# Patient Record
Sex: MALE | Race: ASIAN | NOT HISPANIC OR LATINO | Employment: OTHER | URBAN - METROPOLITAN AREA
[De-identification: names, ages, dates, MRNs, and addresses within clinical notes are randomized per-mention and may not be internally consistent; named-entity substitution may affect disease eponyms.]

---

## 2022-05-29 ENCOUNTER — APPOINTMENT (OUTPATIENT)
Dept: GENERAL RADIOLOGY | Facility: CLINIC | Age: 82
End: 2022-05-29
Attending: EMERGENCY MEDICINE
Payer: COMMERCIAL

## 2022-05-29 ENCOUNTER — HOSPITAL ENCOUNTER (EMERGENCY)
Facility: CLINIC | Age: 82
Discharge: HOME | End: 2022-05-30
Attending: EMERGENCY MEDICINE | Admitting: EMERGENCY MEDICINE
Payer: COMMERCIAL

## 2022-05-29 ENCOUNTER — APPOINTMENT (OUTPATIENT)
Dept: CT IMAGING | Facility: CLINIC | Age: 82
End: 2022-05-29
Attending: EMERGENCY MEDICINE
Payer: COMMERCIAL

## 2022-05-29 DIAGNOSIS — N30.00 ACUTE CYSTITIS WITHOUT HEMATURIA: ICD-10-CM

## 2022-05-29 DIAGNOSIS — Z20.822 COVID-19 RULED OUT BY LABORATORY TESTING: ICD-10-CM

## 2022-05-29 DIAGNOSIS — R41.82 ALTERED MENTAL STATUS, UNSPECIFIED ALTERED MENTAL STATUS TYPE: ICD-10-CM

## 2022-05-29 LAB
ALBUMIN SERPL-MCNC: 3.5 G/DL (ref 3.4–5)
ALBUMIN UR-MCNC: 30 MG/DL
ALP SERPL-CCNC: 67 U/L (ref 40–150)
ALT SERPL W P-5'-P-CCNC: 57 U/L (ref 0–70)
ANION GAP SERPL CALCULATED.3IONS-SCNC: 9 MMOL/L (ref 3–14)
APPEARANCE UR: ABNORMAL
AST SERPL W P-5'-P-CCNC: 38 U/L (ref 0–45)
BACTERIA #/AREA URNS HPF: ABNORMAL /HPF
BASOPHILS # BLD AUTO: 0 10E3/UL (ref 0–0.2)
BASOPHILS NFR BLD AUTO: 0 %
BILIRUB SERPL-MCNC: 1.2 MG/DL (ref 0.2–1.3)
BILIRUB UR QL STRIP: NEGATIVE
BUN SERPL-MCNC: 22 MG/DL (ref 7–30)
CALCIUM SERPL-MCNC: 8.6 MG/DL (ref 8.5–10.1)
CHLORIDE BLD-SCNC: 105 MMOL/L (ref 94–109)
CO2 SERPL-SCNC: 23 MMOL/L (ref 20–32)
COLOR UR AUTO: YELLOW
CREAT SERPL-MCNC: 1.27 MG/DL (ref 0.66–1.25)
EOSINOPHIL # BLD AUTO: 0 10E3/UL (ref 0–0.7)
EOSINOPHIL NFR BLD AUTO: 0 %
ERYTHROCYTE [DISTWIDTH] IN BLOOD BY AUTOMATED COUNT: 13.2 % (ref 10–15)
GFR SERPL CREATININE-BSD FRML MDRD: 57 ML/MIN/1.73M2
GLUCOSE BLD-MCNC: 140 MG/DL (ref 70–99)
GLUCOSE UR STRIP-MCNC: NEGATIVE MG/DL
HCO3 BLDV-SCNC: 26 MMOL/L (ref 21–28)
HCT VFR BLD AUTO: 46.7 % (ref 40–53)
HGB BLD-MCNC: 16 G/DL (ref 13.3–17.7)
HGB UR QL STRIP: ABNORMAL
HOLD SPECIMEN: NORMAL
IMM GRANULOCYTES # BLD: 0 10E3/UL
IMM GRANULOCYTES NFR BLD: 0 %
KETONES UR STRIP-MCNC: NEGATIVE MG/DL
LACTATE BLD-SCNC: 1.4 MMOL/L
LEUKOCYTE ESTERASE UR QL STRIP: ABNORMAL
LYMPHOCYTES # BLD AUTO: 1.7 10E3/UL (ref 0.8–5.3)
LYMPHOCYTES NFR BLD AUTO: 21 %
MCH RBC QN AUTO: 30.8 PG (ref 26.5–33)
MCHC RBC AUTO-ENTMCNC: 34.3 G/DL (ref 31.5–36.5)
MCV RBC AUTO: 90 FL (ref 78–100)
MONOCYTES # BLD AUTO: 0.8 10E3/UL (ref 0–1.3)
MONOCYTES NFR BLD AUTO: 10 %
MUCOUS THREADS #/AREA URNS LPF: PRESENT /LPF
NEUTROPHILS # BLD AUTO: 5.6 10E3/UL (ref 1.6–8.3)
NEUTROPHILS NFR BLD AUTO: 69 %
NITRATE UR QL: NEGATIVE
NRBC # BLD AUTO: 0 10E3/UL
NRBC BLD AUTO-RTO: 0 /100
PCO2 BLDV: 38 MM HG (ref 40–50)
PH BLDV: 7.44 [PH] (ref 7.32–7.43)
PH UR STRIP: 6 [PH] (ref 5–7)
PLATELET # BLD AUTO: 158 10E3/UL (ref 150–450)
PO2 BLDV: 40 MM HG (ref 25–47)
POTASSIUM BLD-SCNC: 3.6 MMOL/L (ref 3.4–5.3)
PROT SERPL-MCNC: 7.8 G/DL (ref 6.8–8.8)
RBC # BLD AUTO: 5.19 10E6/UL (ref 4.4–5.9)
RBC URINE: 10 /HPF
SAO2 % BLDV: 77 % (ref 94–100)
SARS-COV-2 RNA RESP QL NAA+PROBE: NEGATIVE
SODIUM SERPL-SCNC: 137 MMOL/L (ref 133–144)
SP GR UR STRIP: 1.01 (ref 1–1.03)
SQUAMOUS EPITHELIAL: 10 /HPF
TRANSITIONAL EPI: <1 /HPF
TROPONIN I SERPL HS-MCNC: 18 NG/L
UROBILINOGEN UR STRIP-MCNC: NORMAL MG/DL
WBC # BLD AUTO: 8.1 10E3/UL (ref 4–11)
WBC CLUMPS #/AREA URNS HPF: PRESENT /HPF
WBC URINE: >182 /HPF
YEAST #/AREA URNS HPF: ABNORMAL /HPF

## 2022-05-29 PROCEDURE — 87086 URINE CULTURE/COLONY COUNT: CPT | Performed by: EMERGENCY MEDICINE

## 2022-05-29 PROCEDURE — 99207 PR NO BILLABLE SERVICE THIS VISIT: CPT | Performed by: PSYCHIATRY & NEUROLOGY

## 2022-05-29 PROCEDURE — 82803 BLOOD GASES ANY COMBINATION: CPT

## 2022-05-29 PROCEDURE — 99285 EMERGENCY DEPT VISIT HI MDM: CPT | Mod: 25 | Performed by: EMERGENCY MEDICINE

## 2022-05-29 PROCEDURE — 99291 CRITICAL CARE FIRST HOUR: CPT | Mod: 25 | Performed by: EMERGENCY MEDICINE

## 2022-05-29 PROCEDURE — 81001 URINALYSIS AUTO W/SCOPE: CPT | Performed by: EMERGENCY MEDICINE

## 2022-05-29 PROCEDURE — 71045 X-RAY EXAM CHEST 1 VIEW: CPT | Mod: 26 | Performed by: RADIOLOGY

## 2022-05-29 PROCEDURE — 71045 X-RAY EXAM CHEST 1 VIEW: CPT

## 2022-05-29 PROCEDURE — 80053 COMPREHEN METABOLIC PANEL: CPT | Performed by: EMERGENCY MEDICINE

## 2022-05-29 PROCEDURE — 85025 COMPLETE CBC W/AUTO DIFF WBC: CPT | Performed by: EMERGENCY MEDICINE

## 2022-05-29 PROCEDURE — 93010 ELECTROCARDIOGRAM REPORT: CPT | Performed by: EMERGENCY MEDICINE

## 2022-05-29 PROCEDURE — 70450 CT HEAD/BRAIN W/O DYE: CPT

## 2022-05-29 PROCEDURE — 70450 CT HEAD/BRAIN W/O DYE: CPT | Mod: 26 | Performed by: STUDENT IN AN ORGANIZED HEALTH CARE EDUCATION/TRAINING PROGRAM

## 2022-05-29 PROCEDURE — 96365 THER/PROPH/DIAG IV INF INIT: CPT | Performed by: EMERGENCY MEDICINE

## 2022-05-29 PROCEDURE — U0005 INFEC AGEN DETEC AMPLI PROBE: HCPCS | Performed by: EMERGENCY MEDICINE

## 2022-05-29 PROCEDURE — 93005 ELECTROCARDIOGRAM TRACING: CPT | Performed by: EMERGENCY MEDICINE

## 2022-05-29 PROCEDURE — C9803 HOPD COVID-19 SPEC COLLECT: HCPCS | Performed by: EMERGENCY MEDICINE

## 2022-05-29 PROCEDURE — 36415 COLL VENOUS BLD VENIPUNCTURE: CPT | Performed by: EMERGENCY MEDICINE

## 2022-05-29 PROCEDURE — 84484 ASSAY OF TROPONIN QUANT: CPT | Performed by: EMERGENCY MEDICINE

## 2022-05-30 ENCOUNTER — APPOINTMENT (OUTPATIENT)
Dept: MRI IMAGING | Facility: CLINIC | Age: 82
End: 2022-05-30
Attending: EMERGENCY MEDICINE
Payer: COMMERCIAL

## 2022-05-30 VITALS
HEART RATE: 85 BPM | DIASTOLIC BLOOD PRESSURE: 75 MMHG | RESPIRATION RATE: 18 BRPM | SYSTOLIC BLOOD PRESSURE: 145 MMHG | WEIGHT: 168 LBS | OXYGEN SATURATION: 95 % | TEMPERATURE: 98.6 F

## 2022-05-30 LAB
ATRIAL RATE - MUSE: 96 BPM
DIASTOLIC BLOOD PRESSURE - MUSE: NORMAL MMHG
INTERPRETATION ECG - MUSE: NORMAL
P AXIS - MUSE: 21 DEGREES
PR INTERVAL - MUSE: 212 MS
QRS DURATION - MUSE: 96 MS
QT - MUSE: 386 MS
QTC - MUSE: 487 MS
R AXIS - MUSE: 53 DEGREES
SYSTOLIC BLOOD PRESSURE - MUSE: NORMAL MMHG
T AXIS - MUSE: 55 DEGREES
VENTRICULAR RATE- MUSE: 96 BPM

## 2022-05-30 PROCEDURE — 70553 MRI BRAIN STEM W/O & W/DYE: CPT

## 2022-05-30 PROCEDURE — 250N000009 HC RX 250: Performed by: EMERGENCY MEDICINE

## 2022-05-30 PROCEDURE — 255N000002 HC RX 255 OP 636: Performed by: EMERGENCY MEDICINE

## 2022-05-30 PROCEDURE — A9585 GADOBUTROL INJECTION: HCPCS | Performed by: EMERGENCY MEDICINE

## 2022-05-30 PROCEDURE — 250N000011 HC RX IP 250 OP 636: Performed by: EMERGENCY MEDICINE

## 2022-05-30 PROCEDURE — 70553 MRI BRAIN STEM W/O & W/DYE: CPT | Mod: 26 | Performed by: RADIOLOGY

## 2022-05-30 RX ORDER — CEFTRIAXONE 1 G/1
1 INJECTION, POWDER, FOR SOLUTION INTRAMUSCULAR; INTRAVENOUS ONCE
Status: COMPLETED | OUTPATIENT
Start: 2022-05-30 | End: 2022-05-30

## 2022-05-30 RX ORDER — LIDOCAINE HYDROCHLORIDE 20 MG/ML
10 JELLY TOPICAL ONCE
Status: COMPLETED | OUTPATIENT
Start: 2022-05-30 | End: 2022-05-30

## 2022-05-30 RX ORDER — CEFDINIR 300 MG/1
300 CAPSULE ORAL 2 TIMES DAILY
Qty: 20 CAPSULE | Refills: 0 | Status: SHIPPED | OUTPATIENT
Start: 2022-05-30 | End: 2022-06-09

## 2022-05-30 RX ORDER — GADOBUTROL 604.72 MG/ML
7.5 INJECTION INTRAVENOUS ONCE
Status: COMPLETED | OUTPATIENT
Start: 2022-05-30 | End: 2022-05-30

## 2022-05-30 RX ADMIN — CEFTRIAXONE SODIUM 1 G: 1 INJECTION, POWDER, FOR SOLUTION INTRAMUSCULAR; INTRAVENOUS at 02:06

## 2022-05-30 RX ADMIN — LIDOCAINE HYDROCHLORIDE 10 ML: 20 JELLY TOPICAL at 02:26

## 2022-05-30 RX ADMIN — GADOBUTROL 7.5 ML: 604.72 INJECTION INTRAVENOUS at 01:21

## 2022-05-30 NOTE — ED NOTES
Patient arrived via ems. 500ml of NS given and 1.5mg of Narcan given by EMS  Patient presenting lethargic but gradually responding more. There is evidence of emesis on patient shirt and mouth. Patient also presents with significant loss of bowel and bladder from preceding episode of being unresponsive at home. Will continue to monitor and implement orders as needed per patient condition.

## 2022-05-30 NOTE — CONSULTS
"[facility]  Neurology Consultation    Patient Name:  Jadiel Hanson  MRN:  [id number]    :  1940  Date of Service:  May 29, 2022  Primary care provider:  No primary care provider on file.      Neurology consultation service was asked to see Jadiel Hanson by Dr. Mcintosh to evaluate LOC event.    Chief Complaint:  episode    History of Present Illness:   Jadiel Hanson is a 81 year old male with history of CAD with prior MI, HFrEF, CKD3, T2DM, ischemic stroke in 2019 with residual word finding difficulties, PAD who presents after episode at home.     History is obtain from patient, wife and niece. Wife notes that patient was in normal state of health yesterday. This morning patient reports that he was feeling generalized weakness that was making it difficult to ambulate. Due to this, wife was helping him move and during a transfer he was noted to have an episode of \"vomiting and bubbling at his mouth.\" Initially it was noted that patient may have had LOC during episode, however upon further discussion with wife she said he was indeed responsive and she asked him to track her finger moving side to side and he did so. Patient was also able to detail the event to me, and denied LOC. Aside from the secretions, patient was also noted to have urinary incontinence which per niece is not uncommon for him. Denies upper or lower extremity shaking, tongue biting, fall, hitting head. There was some confusion after event, but family notes he is currently at his baseline.     Per EMS report, patient appeared to be unresponsive when they arrived on scene therefore administered 1.2 mg of Narcan without effect. His blood glucose was in the 100's, vitally stable. Noted to have urine and vomiting on him.    No personal history of seizures. No history of head injuries, but does have history of stroke in 2019. Denies recent illnesses. No new medications.    Patient is " currently only in the [place] for ~3 months visiting family, and receives all of his medical care back home in the [facility].     ROS  A comprehensive ROS was performed and pertinent findings were included in HPI.     PMH  CKD, HTN, CAD, ischemic stroke in 2019    PSH: none    Medications   I have personally reviewed the patient's medication list.     Allergies  I have personally reviewed the patient's allergy list.     Social History  Denies tobacco, alcohol, and recreational drug use     Family History    Reviewed, and notably negative for seizures      Physical Examination   Vitals: BP (!) 147/96   Pulse 96   Temp 98.8  F (37.1  C) (Oral)   Resp 14   Wt 76.2 kg (168 lb)   SpO2 97%   General: Lying in bed, NAD  Head: normocephalic, no tongue lacerations or bruises  Eyes: no icterus, op pink and moist  Cardiac: regular rate  Respiratory: non-labored on 4L NC  GI: soft  Skin: No rash or lesion on exposed skin  Psych: Mood pleasant, affect congruent    Neuro:  Mental status: Awake, alert, attentive, oriented to self and circumstance. Tells me his birth year when asks date, and then later 20[birth year] (question language barrier). Language is fragmented with intermittent word finding difficulties.   Cranial nerves: Visual fields appear full however difficult for patient to follow commands, PERRL, conjugate gaze, EOMI, facial sensation intact, face symmetric, shoulder shrug strong, tongue/uvula midline, no dysarthria.   Motor: Normal bulk and tone. No abnormal movements. 5/5 strength bilaterally in deltoids, biceps, triceps, hand , hip flexors, hip extensors, knee flexion, knee extension, plantarflexion, dorsiflexion.   Reflexes: Normo-reflexic and symmetric biceps, brachioradialis, triceps, patellae, and achilles.  Sensory: Intact to light touch in proximal and distal aspects of all 4 extremities   Coordination: FNF without ataxia or dysmetria.   Gait: deferred.    Investigations   I have personally reviewed pertinent  labs, tests, and radiological imaging. Discussion of notable findings is included under Impression.     CT Head without contrast  Impression:  1. No definite acute intracranial pathology.   2. Small left frontal hyperdensity favored to represent artifact over  acute hemorrhage.    3. Atherosclerosis in the carotid siphons and vertebral arteries.    Was patient transferred from outside hospital?   No       Impression  Jadiel Hanson is a 81 year old male with PMH significant for CAD with history of MI, HFrEF, CKD3, T2DM, ischemic stroke in 2019 with residual word finding difficulties, PAD who presented after episode at home. Neurology was consulted to rule out seizure vs TIA as etiology of episode. Neurologic exam this evening with disorientation (?baseline) and intermittent word finding difficulties. Work up thus far with elevated Cr in the setting of known CKD. CT head without acute pathology.     I have a lower suspicion for episode being seizure based off the history obtained. He was able to follow commands though somnolent through episode, he was able to detail to me events, no shaking or abnormal movements, no tongue biting, loss of bladder present but is noted to be baseline, no seizure history. Also less likely TIA as there were no new focal deficits during episode or my exam. With that being said, it would be reasonable to obtain an outpatient seizure work up with uncharacterized event. Patient has returned to baseline therefore from a neurologic stand point patient can discharge home. Final disposition pending remainder of ED work up.       Recommendations  - Agree with infectious work up given generalized weakness, including UA  - Would be reasonable to obtain outpatient seizure work up     - MRI brain   - Routine EEG  - No indication for initiation for AED given low suspicion for seizure at this time    Thank you for involving Neurology in the care of Jadiel Hanson.  Please do not  hesitate to call with questions/concerns (consult pager [phone]).      Patient was discussed with Dr. Wen.    Ramila Marinelli MD  Neurology Resident    Attending physician: I discussed the care of this patient with the evaluating resident by telephone and I agree with the initial plan of care as documented above. Patient not seen or examined by me today.    Reinier Albert MD

## 2022-05-30 NOTE — ED TRIAGE NOTES
Found by family on floor of home unresponsive. Pt's LKW was 2pm per family. 1.2 mg narcan given by EMS. Pinpoint pupils per EMS.  . VSS en route.      Triage Assessment     Row Name 05/29/22 2006       Triage Assessment (Adult)    Airway WDL WDL       Respiratory WDL    Respiratory WDL WDL       Skin Circulation/Temperature WDL    Skin Circulation/Temperature WDL WDL       Cardiac WDL    Cardiac WDL WDL       Peripheral/Neurovascular WDL    Peripheral Neurovascular WDL WDL       Cognitive/Neuro/Behavioral WDL    Cognitive/Neuro/Behavioral WDL X;all    Level of Consciousness lethargic

## 2022-05-30 NOTE — ED PROVIDER NOTES
ED Provider Note  [facility]      History     Chief Complaint   Patient presents with     Loss of Consciousness     HPI  Jadiel Hanson is a 81 year old male who brought in by ambulance for loss of consciousness, period of unresponsiveness and subsequent confusion.  Paramedics provide the history as the patient is only able to give 1-2 word answers slurring his speech.  Per EMS report, his last known well was approximately 2 PM, had a long nap, then was found by family and was unresponsive.  They arrived on scene and administered 1.2 mg of Narcan without effect.  They found his glucose to be greater than 160 with normal vital signs.  He appeared to be protecting his airway but did have some evidence of incontinence as well as vomiting.    Prior MI, CKD, prior CVA      Review of Systems   Unable to perform ROS: Mental status change     A complete review of systems was attempted but limited due to altered mental status.    Physical Exam   BP: (!) 147/96  Pulse: 96  Temp: 98.8  F (37.1  C)  Resp: 14  Weight: 76.2 kg (168 lb)  SpO2: 97 %  Physical Exam  Constitutional:       General: He is awake. He is not in acute distress.     Appearance: Normal appearance. He is well-developed. He is not ill-appearing or toxic-appearing.   HENT:      Head: Atraumatic.   Eyes:      General: No scleral icterus.     Extraocular Movements: Extraocular movements intact.      Pupils: Pupils are equal, round, and reactive to light.   Cardiovascular:      Rate and Rhythm: Normal rate and regular rhythm.      Heart sounds: Normal heart sounds, S1 normal and S2 normal.   Pulmonary:      Effort: No respiratory distress.      Breath sounds: Normal breath sounds.   Chest:       Abdominal:      General: Bowel sounds are normal.      Palpations: Abdomen is soft.      Tenderness: There is no abdominal tenderness.   Musculoskeletal:         General: No tenderness.   Skin:     General: Skin is warm.      Findings:  "No rash.   Neurological:      Mental Status: He is oriented to person, place, and time. He is confused.      Cranial Nerves: Dysarthria present.      Comments: Mild right-sided facial droop, mild right-sided weakness in the upper and lower extremities.  When asked what year it is, he replies \"1940.\"  When asked what city we are in he replies \"[place].\"  He has lived his entire life until recently in the [facility], when I ask him who the president of the [facility] is, he does not know.    Neuro exam is slightly limited as patient does not seem to be able to follow every single command.   Psychiatric:         Mood and Affect: Affect is blunt.         Speech: Speech is slurred.         Behavior: Behavior is cooperative.         ED Course      Procedures            EKG Interpretation:      Interpreted by ELANA GARCIA MD  Time reviewed: 2015  Symptoms at time of EKG: Confusion, altered mental status  Rhythm: normal sinus sinus rhythm with first-degree AV block  Rate: normal  Axis: normal  Ectopy: none  Conduction: normal  ST Segments/ T Waves: No ST-T wave changes, poor R wave progression  Q Waves: none  Comparison to prior: No old EKG available    Clinical Impression: no acute ischemic changes        Critical Care Addendum    My initial assessment, based on my review of prehospital provider report, review of nursing observations, focused history, physical exam, review of cardiac rhythm monitor, 12 lead ECG analysis and discussion with neurology, established that Jadiel Hanson has altered mental status, which requires immediate intervention, and therefore he is critically ill.     After the initial assessment, the care team initiated multiple lab tests, initiated medication therapy with ceftriaxone and consulted with Neurology to provide stabilization care. Due to the critical nature of this patient, I reassessed nursing observations, physical exam, review of cardiac rhythm monitor, mental status and " neurologic status multiple times prior to his disposition.     Time also spent performing documentation, discussion with family to obtain medical information for decision making, reviewing test results and discussion with consultants.     Critical care time (excluding teaching time and procedures): 53 minutes.               Results for orders placed or performed during the hospital encounter of 05/29/22   CT Head w/o Contrast     Status: None    Narrative    CT HEAD W/O CONTRAST 5/29/2022 8:32 PM    History: Mental status change, unknown cause   ICD-10:    Comparison: None    Technique: Using multidetector thin collimation helical acquisition  technique, axial, coronal and sagittal CT images from the skull base  to the vertex were obtained without intravenous contrast.   (topogram) image(s) also obtained and reviewed.    Findings: There is no definite evidence of intracranial hemorrhage,  mass effect, or midline shift. Question small hyperdensity in the  anterior left frontal lobe asymmetric to the right on series 3 image  18, not well identified on sagittal or coronal imaging and favored to  represent artifact. Gray/white matter differentiation in both cerebral  hemispheres is preserved. Ventricles are proportionate to the cerebral  sulci. The basal cisterns are clear. Extensive atherosclerosis in the  carotid siphons and left vertebral artery.    The bony calvaria and the bones of the skull base are normal. The  visualized portions of the paranasal sinuses and mastoid air cells are  clear.      Impression    Impression:  1. No definite acute intracranial pathology.   2. Small left frontal hyperdensity favored to represent artifact over  acute hemorrhage.    3. Atherosclerosis in the carotid siphons and vertebral arteries.         I have personally reviewed the examination and initial interpretation  and I agree with the findings.    RUBEN KHAN MD         SYSTEM ID:  [id number]   XR Chest Port 1 View      Status: None    Narrative    Exam: XR CHEST PORT 1 VIEW, 5/29/2022 8:18 PM    Indication: altered mental status, unresponsive    Comparison: None    Findings:   Cardiac silhouette appears within normal limits. No pneumothorax. No  definite effusions. Left greater than right bibasilar opacities.      Impression    Impression: Bibasilar atelectasis/edema. Aspiration could be  considered in the setting of altered mental status.    I have personally reviewed the examination and initial interpretation  and I agree with the findings.    MARKOS ROBERTS MD         SYSTEM ID:  [id number]   MR Brain w/o & w Contrast     Status: None    Narrative    EXAM: MR BRAIN W/O and W CONTRAST  LOCATION: [facility]  DATE/TIME: 5/30/2022 12:48 AM    INDICATION: Mental status change, unknown cause  COMPARISON: 05/29/2022  CONTRAST: 7.5mL Gadavist  TECHNIQUE: MRI brain without and with contrast.    FINDINGS: On the diffusion-weighted images there is no evidence of acute ischemia. There is no discrete mass lesion or midline shift. There is no acute extra-axial fluid collection or acute intraparenchymal hemorrhage. There are appropriate flow voids   within the cavernous portions of the internal carotid arteries and the basilar artery. On the FLAIR and T2-weighted images there are a few small foci of high signal within the periventricular and subcortical white matter with mild small vessel ischemic   disease. The ventricular system, basal cisterns and the cortical sulci are consistent with mild volume loss. Following the administration of contrast no abnormal enhancement is visualized.    There is no evidence of cerebellar tonsillar ectopia. The corpus callosum is within normal limits. 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 The orbit regions are unremarkable. There  is no significant paranasal sinus. The mastoid air cells and the middle ear clear.      Impression    IMPRESSION:  1. No discrete mass lesion, hemorrhage or focal area suggestive of acute ischemia.  2. No abnormal enhancement.  3. Mild age-related changes.  4.  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   [place] Draw     Status: None    Narrative    The following orders were created for panel order [place] Draw.  Procedure                               Abnormality         Status                     ---------                               -----------         ------                     Extra Blue Top Tube[[id number]]                              Final result               Extra Red Top Tube[[id number]]                               Final result               Extra Green Top (Lithium...[[id number]]                      Final result               Extra Purple Top Tube[[id number]]                            Final result                 Please view results for these tests on the individual orders.   Comprehensive metabolic panel     Status: Abnormal   Result Value Ref Range    Sodium 137 133 - 144 mmol/L    Potassium 3.6 3.4 - 5.3 mmol/L    Chloride 105 94 - 109 mmol/L    Carbon Dioxide (CO2) 23 20 - 32 mmol/L    Anion Gap 9 3 - 14 mmol/L    Urea Nitrogen 22 7 - 30 mg/dL    Creatinine 1.27 (H) 0.66 - 1.25 mg/dL    Calcium 8.6 8.5 - 10.1 mg/dL    Glucose 140 (H) 70 - 99 mg/dL    Alkaline Phosphatase 67 40 - 150 U/L    AST 38 0 - 45 U/L    ALT 57 0 - 70 U/L    Protein Total 7.8 6.8 - 8.8 g/dL    Albumin 3.5 3.4 - 5.0 g/dL    Bilirubin Total 1.2 0.2 - 1.3 mg/dL    GFR Estimate 57 (L) >60 mL/min/1.73m2   Troponin I     Status: Normal   Result Value Ref Range    Troponin I High Sensitivity 18 <79 ng/L   UA with Microscopic reflex to Culture     Status: Abnormal    Specimen: Urine, Clean Catch   Result Value Ref Range    Color Urine Yellow  Colorless, Straw, Light Yellow, Yellow    Appearance Urine Slightly Cloudy (A) Clear    Glucose Urine Negative Negative mg/dL    Bilirubin Urine Negative Negative    Ketones Urine Negative Negative mg/dL    Specific Gravity Urine 1.014 1.003 - 1.035    Blood Urine Small (A) Negative    pH Urine 6.0 5.0 - 7.0    Protein Albumin Urine 30  (A) Negative mg/dL    Urobilinogen Urine Normal Normal, 2.0 mg/dL    Nitrite Urine Negative Negative    Leukocyte Esterase Urine Large (A) Negative    Bacteria Urine Many (A) None Seen /HPF    WBC Clumps Urine Present (A) None Seen /HPF    Budding Yeast Urine Few (A) None Seen /HPF    Mucus Urine Present (A) None Seen /LPF    RBC Urine 10 (H) <=2 /HPF    WBC Urine >182 (H) <=5 /HPF    Squamous Epithelials Urine 10 (H) <=1 /HPF    Transitional Epithelials Urine <1 <=1 /HPF    Narrative    Urine Culture ordered based on laboratory criteria   Asymptomatic COVID-19 Virus (Coronavirus) by PCR Nose     Status: Normal    Specimen: Nose; Swab   Result Value Ref Range    SARS CoV2 PCR Negative Negative, Testing sent to reference lab. Results will be returned via unsolicited result    Narrative    Testing was performed using the Xpert Xpress SARS-CoV-2 Assay on the  Cepheid Gene-Xpert Instrument Systems. Additional information about  this Emergency Use Authorization (EUA) assay can be found via the Lab  Guide. This test should be ordered for the detection of SARS-CoV-2 in  individuals who meet SARS-CoV-2 clinical and/or epidemiological  criteria. Test performance is unknown in asymptomatic patients. This  test is for in vitro diagnostic use under the FDA EUA for  laboratories certified under CLIA to perform high complexity testing.  This test has not been FDA cleared or approved. A negative result  does not rule out the presence of PCR inhibitors in the specimen or  target RNA in concentration below the limit of detection for the  assay. The possibility of a false negative should be considered  if  the patient's recent exposure or clinical presentation suggests  COVID-19. This test was validated by the [facility]. This laboratory is certified under  the Clinical Laboratory Improvement Amendments of 1988 (CLIA-88) as  qualified to perform high complexity laboratory testing.     Extra Blue Top Tube     Status: None   Result Value Ref Range    Hold Specimen JIC    Extra Red Top Tube     Status: None   Result Value Ref Range    Hold Specimen JIC    Extra Green Top (Lithium Heparin) Tube     Status: None   Result Value Ref Range    Hold Specimen JIC    Extra Purple Top Tube     Status: None   Result Value Ref Range    Hold Specimen JIC    CBC with platelets and differential     Status: None   Result Value Ref Range    WBC Count 8.1 4.0 - 11.0 10e3/uL    RBC Count 5.19 4.40 - 5.90 10e6/uL    Hemoglobin 16.0 13.3 - 17.7 g/dL    Hematocrit 46.7 40.0 - 53.0 %    MCV 90 78 - 100 fL    MCH 30.8 26.5 - 33.0 pg    MCHC 34.3 31.5 - 36.5 g/dL    RDW 13.2 10.0 - 15.0 %    Platelet Count 158 150 - 450 10e3/uL    % Neutrophils 69 %    % Lymphocytes 21 %    % Monocytes 10 %    % Eosinophils 0 %    % Basophils 0 %    % Immature Granulocytes 0 %    NRBCs per 100 WBC 0 <1 /100    Absolute Neutrophils 5.6 1.6 - 8.3 10e3/uL    Absolute Lymphocytes 1.7 0.8 - 5.3 10e3/uL    Absolute Monocytes 0.8 0.0 - 1.3 10e3/uL    Absolute Eosinophils 0.0 0.0 - 0.7 10e3/uL    Absolute Basophils 0.0 0.0 - 0.2 10e3/uL    Absolute Immature Granulocytes 0.0 <=0.4 10e3/uL    Absolute NRBCs 0.0 10e3/uL   iStat Gases (lactate) venous, POCT     Status: Abnormal   Result Value Ref Range    Lactic Acid POCT 1.4 <=2.0 mmol/L    Bicarbonate Venous POCT 26 21 - 28 mmol/L    O2 Sat, Venous POCT 77 (L) 94 - 100 %    pCO2V Venous POCT 38 (L) 40 - 50 mm Hg    pH Venous POCT 7.44 (H) 7.32 - 7.43    pO2 Venous POCT 40 25 - 47 mm Hg   CBC with platelets differential     Status: None    Narrative    The following  orders were created for panel order CBC with platelets differential.  Procedure                               Abnormality         Status                     ---------                               -----------         ------                     CBC with platelets and d...[[id number]]                      Final result                 Please view results for these tests on the individual orders.     Medications   cefTRIAXone (ROCEPHIN) 1 g vial to attach to  mL bag for ADULTS or NS 50 mL bag for PEDS (1 g Intravenous New Bag 5/30/22 0206)   gadobutrol (GADAVIST) injection 7.5 mL (7.5 mLs Intravenous Given 5/30/22 0121)        Assessments & Plan (with Medical Decision Making)   Jadiel Hanson is a 81 year old male who brought in by ambulance for loss of consciousness, period of unresponsiveness and subsequent confusion.  Initially there was not very much history and all of it was provided by EMS.  They had reported that the patient was unresponsive initially and he had received 1.2 mg of Narcan, however he had no real response to the Narcan.  In route here, his mental status apparently seemed to start improving and at the time of arrival here he appeared in no distress, however he seemed to be confused with slurred speech and right-sided deficits.  Obtained screening labs and head imaging for concern for intracranial hemorrhage versus stroke versus syncope versus infection versus medication effect.  The differential is quite broad.    I have reviewed the nursing notes. I have reviewed the findings, diagnosis, plan and need for follow up with the patient.    Initial labs were unrevealing and reassuring although the UA had not resulted yet.  CT scan obtained of the head and showed possible small hemorrhage versus more likely artifact.  No significant area of bleeding to explain the patient's altered mental status.  Family arrived at bedside and were able to provide more history.  Apparently, the patient has  had some worsening weakness throughout the day in his lower extremities.  In the meantime as well, his mental status appears to have improved significantly.  He is able to respond generally to my questions despite having some word finding difficulties and slurred speech.  He denied any chest pain or shortness of breath prior to his episode of unresponsiveness.  Apparently he was conscious throughout the entire episode.  He does have a history of stroke with some residual right-sided deficits.  He has no seizure history.  Neurology consulted for concern for new onset seizure given his period of unresponsiveness and confusion at the time of my initial evaluation where he was completely disoriented.  They evaluated the patient and feel that this is less likely a seizure and more likely a medical issue.  They recommend outpatient work-up with MRI and EEG.  However, given the patient CT scan with concern for possible bleed versus artifact, will obtain an MRI here.  UA with evidence of UTI which I suspect is likely explains the patient's symptoms including weakness and confusion.  Discussed at length with the family because they were initially reluctant to stay for MRI and thought that they would be discharged after the neurology evaluation.  However, they are amenable to obtaining the MRI which will also assist in the outpatient neurology follow-up.  MRI with no evidence of bleed which is reassuring.  Family reports that they will be able to care for the patient despite his level of confusion at home.  We will give a dose of ceftriaxone here and discharge with cefdinir prescription and referrals to both neurology and primary care.  Patient will remain in the [place] for the next approximately 3 months before returning to the [facility].  Return precautions given.  Okay to discharge    New Prescriptions    CEFDINIR (OMNICEF) 300 MG CAPSULE    Take 1 capsule (300 mg) by mouth 2 times daily for 10 days       Final  diagnoses:   Altered mental status, unspecified altered mental status type   Acute cystitis without hematuria       --  Tha Mcintosh MD PhD  [facility] EMERGENCY DEPARTMENT  5/29/2022     Tha Mcintosh MD  05/30/22 0217

## 2022-06-01 LAB — BACTERIA UR CULT: ABNORMAL
